# Patient Record
Sex: MALE | Race: WHITE | Employment: OTHER | URBAN - METROPOLITAN AREA
[De-identification: names, ages, dates, MRNs, and addresses within clinical notes are randomized per-mention and may not be internally consistent; named-entity substitution may affect disease eponyms.]

---

## 2017-02-13 ENCOUNTER — OFFICE VISIT (OUTPATIENT)
Dept: NEUROLOGY | Age: 72
End: 2017-02-13

## 2017-02-13 VITALS
HEART RATE: 87 BPM | WEIGHT: 189 LBS | HEIGHT: 70 IN | DIASTOLIC BLOOD PRESSURE: 70 MMHG | SYSTOLIC BLOOD PRESSURE: 114 MMHG | OXYGEN SATURATION: 98 % | RESPIRATION RATE: 12 BRPM | BODY MASS INDEX: 27.06 KG/M2

## 2017-02-13 DIAGNOSIS — G40.209 COMPLEX PARTIAL SEIZURE EVOLVING TO GENERALIZED SEIZURE (HCC): Primary | ICD-10-CM

## 2017-02-13 RX ORDER — MULTIVIT WITH MINERALS/HERBS
1 TABLET ORAL DAILY
COMMUNITY

## 2017-02-13 RX ORDER — MELATONIN
DAILY
COMMUNITY

## 2017-02-13 NOTE — PATIENT INSTRUCTIONS
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
## 2017-02-13 NOTE — MR AVS SNAPSHOT
Visit Information Date & Time Provider Department Dept. Phone Encounter #  
 2/13/2017  2:00 PM Callie Morales MD Neurology [facility] at [facility] [phone] [id number] Follow-up Instructions Return in about 6 months (around 8/13/2017). Upcoming Health Maintenance Date Due Hepatitis C Screening 1945 DTaP/Tdap/Td series (1 - Tdap) 7/1/1968 FOBT Q 1 YEAR AGE 50-75 7/1/1997 ZOSTER VACCINE AGE 60> 7/1/2007 GLAUCOMA SCREENING Q2Y 7/1/2012 Pneumococcal 65+ Low/Medium Risk (1 of 2 - PCV13) 7/1/2012 MEDICARE YEARLY EXAM 7/1/2012 INFLUENZA AGE 9 TO ADULT 8/1/2016 Allergies as of 2/13/2017  Review Complete On: 2/13/2017 By: Callie Morales MD  
 No Known Allergies Current Immunizations  Never Reviewed No immunizations on file. Not reviewed this visit You Were Diagnosed With   
  
 Codes Comments 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    -  Primary ICD-10-CM: N14.763 ICD-9-CM: 345.40 Vitals BP Pulse Resp Height(growth percentile) Weight(growth percentile) SpO2  
 114/70 87 12 5' 10\" (1.778 m) 189 lb (85.7 kg) 98% BMI Smoking Status 27.12 kg/m2 Never Smoker Vitals History BMI and BSA Data Body Mass Index Body Surface Area  
 27.12 kg/m 2 2.06 m 2 Preferred Pharmacy Pharmacy Name Phone [facility] [address] [phone] Your Updated Medication List  
  
   
This list is accurate as of: 2/13/17  2:43 PM.  Always use your most recent med list.  
  
  
  
  
 B COMPLEX 1 tablet Generic drug:  b complex vitamins Take 1 Tab by mouth daily. lacosamide 200 mg Tab tablet Commonly known as:  VIMPAT Take 200 mg by mouth two (2) times a day. PHENobarbital 30 mg tablet Commonly known as:  LUMINAL Take 30 mg by mouth two (2) times a day. primidone 250 mg tablet Commonly known as: MYSOLINE Take 500 mg by mouth two (2) times a day. VITAMIN D3 1,000 unit tablet Generic drug:  cholecalciferol Take  by mouth daily. Follow-up Instructions Return in about 6 months (around 8/13/2017). To-Do List   
 02/28/2017 Neurology:  EEG Patient Instructions A Healthy Lifestyle: Care Instructions Your Care Instructions A healthy lifestyle can help you feel good, stay at a healthy weight, and have plenty of energy for both work and play. A healthy lifestyle is something you can share with your whole family. A healthy lifestyle also can lower your risk for serious health problems, such as high blood pressure, heart disease, and diabetes. You can follow a few steps listed below to improve your health and the health of your family. Follow-up care is a key part of your treatment and safety. Be sure to make and go to all appointments, and call your doctor if you are having problems. Its also a good idea to know your test results and keep a list of the medicines you take. How can you care for yourself at home? · Do not eat too much sugar, fat, or fast foods. You can still have dessert and treats now and then. The goal is moderation. · Start small to improve your eating habits. Pay attention to portion sizes, drink less juice and soda pop, and eat more fruits and vegetables. ¨ Eat a healthy amount of food. A 3-ounce serving of meat, for example, is about the size of a deck of cards. Fill the rest of your plate with vegetables and whole grains. ¨ Limit the amount of soda and sports drinks you have every day. Drink more water when you are thirsty. ¨ Eat at least 5 servings of fruits and vegetables every day. It may seem like a lot, but it is not hard to reach this goal. A serving or helping is 1 piece of fruit, 1 cup of vegetables, or 2 cups of leafy, raw vegetables. Have an apple or some carrot sticks as an afternoon snack instead of a candy bar. Try to have fruits and/or vegetables at every meal. 
· Make exercise part of your daily routine. You may want to start with simple activities, such as walking, bicycling, or slow swimming. Try to be active 30 to 60 minutes every day. You do not need to do all 30 to 60 minutes all at once. For example, you can exercise 3 times a day for 10 or 20 minutes. Moderate exercise is safe for most people, but it is always a good idea to talk to your doctor before starting an exercise program. 
· Keep moving. Luci Bonds the lawn, work in the garden, or [employer]. Take the stairs instead of the elevator at work. · If you smoke, quit. People who smoke have an increased risk for heart attack, stroke, cancer, and other lung illnesses. Quitting is hard, but there are ways to boost your chance of quitting tobacco for good. ¨ Use nicotine gum, patches, or lozenges. ¨ Ask your doctor about stop-smoking programs and medicines. ¨ Keep trying. In addition to reducing your risk of diseases in the future, you will notice some benefits soon after you stop using tobacco. If you have shortness of breath or asthma symptoms, they will likely get better within a few weeks after you quit. · Limit how much alcohol you drink. Moderate amounts of alcohol (up to 2 drinks a day for men, 1 drink a day for women) are okay. But drinking too much can lead to liver problems, high blood pressure, and other health problems. Family health If you have a family, there are many things you can do together to improve your health. · Eat meals together as a family as often as possible. · Eat healthy foods. This includes fruits, vegetables, lean meats and dairy, and whole grains. · Include your family in your fitness plan.  Most people think of activities such as jogging or tennis as the way to fitness, but there are many ways you and your family can be more active. Anything that makes you breathe hard and gets your heart pumping is exercise. Here are some tips: 
¨ Walk to do errands or to take your child to school or the bus. ¨ Go for a family bike ride after dinner instead of watching TV. Where can you learn more? Go to http://elana-vu.info/. Enter G767 in the search box to learn more about \"A Healthy Lifestyle: Care Instructions. \" Current as of: July 26, 2016 Content Version: 11.1 © 0961-0038 [employer]. Care instructions adapted under license by [employer] (which disclaims liability or warranty for this information). If you have questions about a medical condition or this instruction, always ask your healthcare professional. [address] any warranty or liability for your use of this information. Introducing [facility]! [employer] introduces [employer] patient portal. Now you can access parts of your medical record, email your doctor's office, and request medication refills online. 1. In your internet browser, go to [email or web address] 2. Click on the First Time User? Click Here link in the Sign In box. You will see the New Member Sign Up page. 3. Enter your [employer] Access Code exactly as it appears below. You will not need to use this code after youve completed the sign-up process. If you do not sign up before the expiration date, you must request a new code. · [employer] Access Code: [email or web address] Expires: 5/14/2017  1:29 PM 
 
4. Enter the last four digits of your Social Security Number (xxxx) and Date of Birth (mm/dd/yyyy) as indicated and click Submit. You will be taken to the next sign-up page. 5. Create a [employer] ID. This will be your [employer] login ID and cannot be changed, so think of one that is secure and easy to remember. 6. Create a [employer] password. You can change your password at any time. 7. Enter your Password Reset Question and Answer. This can be used at a later time if you forget your password. 8. Enter your e-mail address. You will receive e-mail notification when new information is available in [address] 9. Click Sign Up. You can now view and download portions of your medical record. 10. Click the Download Summary menu link to download a portable copy of your medical information. If you have questions, please visit the Frequently Asked Questions section of the [employer] website. Remember, [employer] is NOT to be used for urgent needs. For medical emergencies, dial 911. Now available from your iPhone and Android! Please provide this summary of care documentation to your next provider. Your primary care clinician is listed as Judithann Cockayne. If you have any questions after today's visit, please call [phone].

## 2017-02-24 ENCOUNTER — HOSPITAL ENCOUNTER (OUTPATIENT)
Dept: NEUROLOGY | Age: 72
Discharge: HOME | End: 2017-02-24
Attending: PSYCHIATRY & NEUROLOGY

## 2017-02-24 DIAGNOSIS — G40.209 COMPLEX PARTIAL SEIZURE EVOLVING TO GENERALIZED SEIZURE (HCC): ICD-10-CM

## 2017-02-24 NOTE — PROCEDURES
Patient Name: Cande Olivas  : 1945  Age: 73 y.o. Ordering physician: Deni Barcenas  Date of EE2017  EEG procedure number: [id number]  Diagnosis: CPS  Interpreting physician: Stephen Marie MD      ELECTROENCEPHALOGRAM REPORT     PROCEDURE: EEG. CLINICAL INDICATION: The patient is a 73 y.o. male with a history of   possible seizures. EEG to rule out seizures, rule out stroke, rule out   cortical abnormality. EEG CLASSIFICATION: Essentially normal    DESCRIPTION OF THE RECORD:   The background of this recording contains a posteriorly-located occipital alpha rhythm of 9 Hz that attenuates with eye opening. Throughout the recording, there were no clear areas of focal slowing nor spike or spike-and-wave discharges seen. Hyperventilation was not performed. Photic stimulation produced a minimal driving response in the posterior head regions. During the recording the patient did not achieve stage II sleep    INTERPRETATION: This is a normal electroencephalogram showing no clear focal abnormalities or epileptiform activity. A normal EEG doesn't not rule out seizures. Clinical correlation recommended.       Akilah Lua MD  2017  3:17 PM

## 2017-03-06 ENCOUNTER — TELEPHONE (OUTPATIENT)
Dept: NEUROLOGY | Age: 72
End: 2017-03-06

## 2017-03-06 NOTE — TELEPHONE ENCOUNTER
Patient like to verify if  recvd the medical records from [facility] office and if he had a chance to review the EEG

## 2017-03-06 NOTE — TELEPHONE ENCOUNTER
EEG is in the chart for your review. Notes should have come in the mail.  Advised will look in the mail as these have not come across the desk at the moment    Called: [phone] Dr Ele Jackson office and asked for them to refax

## 2017-03-09 NOTE — TELEPHONE ENCOUNTER
I called the patient, told him EEG was normal, he is doing well, he wants to be seen in June for follow-up    Alexis Brown, can you call him and give him an appointment in June, he wants to be seen in [place]

## 2017-06-29 ENCOUNTER — OFFICE VISIT (OUTPATIENT)
Dept: NEUROLOGY | Age: 72
End: 2017-06-29

## 2017-06-29 VITALS
HEART RATE: 99 BPM | OXYGEN SATURATION: 98 % | RESPIRATION RATE: 12 BRPM | BODY MASS INDEX: 28.06 KG/M2 | DIASTOLIC BLOOD PRESSURE: 78 MMHG | WEIGHT: 196 LBS | HEIGHT: 70 IN | SYSTOLIC BLOOD PRESSURE: 146 MMHG

## 2017-06-29 DIAGNOSIS — G40.209 COMPLEX PARTIAL SEIZURE EVOLVING TO GENERALIZED SEIZURE (HCC): Primary | ICD-10-CM

## 2017-06-29 RX ORDER — PRIMIDONE 250 MG/1
500 TABLET ORAL 2 TIMES DAILY
Qty: 120 TAB | Refills: 11 | Status: SHIPPED | OUTPATIENT
Start: 2017-06-29 | End: 2017-12-14 | Stop reason: SDUPTHER

## 2017-06-29 RX ORDER — LACOSAMIDE 200 MG/1
200 TABLET ORAL 2 TIMES DAILY
Qty: 60 TAB | Refills: 11 | Status: SHIPPED | OUTPATIENT
Start: 2017-06-29 | End: 2017-12-14 | Stop reason: SDUPTHER

## 2017-06-29 NOTE — MR AVS SNAPSHOT
Visit Information Date & Time Provider Department Dept. Phone Encounter #  
 6/29/2017 12:00 PM Sherita Roman MD Neurology Clinic at [facility] [phone] [id number] Follow-up Instructions Return in about 6 months (around 12/29/2017). Your Appointments 12/14/2017 12:00 PM  
Follow Up with Sherita Roman MD  
Neurology Clinic at [facility] Appt Note: f/u seizure, DMV form jrb 6/29/17  
 [address] [facility], 
[address], [address] [address]  
[address] [facility], [address] [facility], [address] [address] Upcoming Health Maintenance Date Due Hepatitis C Screening 1945 DTaP/Tdap/Td series (1 - Tdap) 7/1/1968 FOBT Q 1 YEAR AGE 50-75 7/1/1997 ZOSTER VACCINE AGE 60> 7/1/2007 GLAUCOMA SCREENING Q2Y 7/1/2012 Pneumococcal 65+ Low/Medium Risk (1 of 2 - PCV13) 7/1/2012 MEDICARE YEARLY EXAM 7/1/2012 INFLUENZA AGE 9 TO ADULT 8/1/2017 Allergies as of 6/29/2017  Review Complete On: 6/29/2017 By: Wanda Patrick No Known Allergies Current Immunizations  Never Reviewed No immunizations on file. Not reviewed this visit You Were Diagnosed With   
  
 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  
 146/78 99 12 5' 10\" (1.778 m) 196 lb (88.9 kg) 98% BMI Smoking Status 28.12 kg/m2 Never Smoker Vitals History BMI and BSA Data Body Mass Index Body Surface Area  
 28.12 kg/m 2 2.1 m 2 Preferred Pharmacy Pharmacy Name Phone Quir[facility] [address] [phone] Your Updated Medication List  
  
   
This list is accurate as of: 6/29/17  9:13 PM.  Always use your most recent med list.  
  
  
  
  
 B COMPLEX 1 tablet Generic drug:  b complex vitamins Take 1 Tab by mouth daily. lacosamide 200 mg Tab tablet Commonly known as:  VIMPAT Take 1 Tab by mouth two (2) times a day. Max Daily Amount: 400 mg. PHENobarbital 30 mg tablet Commonly known as:  LUMINAL Take 30 mg by mouth two (2) times a day. primidone 250 mg tablet Commonly known as: MYSOLINE Take 2 Tabs by mouth two (2) times a day. VITAMIN D3 1,000 unit tablet Generic drug:  cholecalciferol Take  by mouth daily. Prescriptions Printed Refills  
 lacosamide (VIMPAT) 200 mg tab tablet 11 Sig: Take 1 Tab by mouth two (2) times a day. Max Daily Amount: 400 mg. Class: Print Route: Oral  
  
Prescriptions Sent to Pharmacy Refills  
 primidone (MYSOLINE) 250 mg tablet 11 Sig: Take 2 Tabs by mouth two (2) times a day. Class: Normal  
 Pharmacy: [address], [address] [place] [address]. Ph #: [phone] Route: Oral  
  
We Performed the Following LACOSAMIDE [90714 CPT(R)] PHENOBARBITAL [id number] CPT(R)] PRIMIDONE (MYSOLINE) [id number] CPT(R)] Follow-up Instructions Return in about 6 months (around 12/29/2017). Patient Instructions A Healthy Lifestyle: Care Instructions Your Care Instructions A healthy lifestyle can help you feel good, stay at a healthy weight, and have plenty of energy for both work and play. A healthy lifestyle is something you can share with your whole family. A healthy lifestyle also can lower your risk for serious health problems, such as high blood pressure, heart disease, and diabetes. You can follow a few steps listed below to improve your health and the health of your family. Follow-up care is a key part of your treatment and safety.  Be sure to make and go to all appointments, and call your doctor if you are having problems. Its also a good idea to know your test results and keep a list of the medicines you take. How can you care for yourself at home? · Do not eat too much sugar, fat, or fast foods. You can still have dessert and treats now and then. The goal is moderation. · Start small to improve your eating habits. Pay attention to portion sizes, drink less juice and soda pop, and eat more fruits and vegetables. ¨ Eat a healthy amount of food. A 3-ounce serving of meat, for example, is about the size of a deck of cards. Fill the rest of your plate with vegetables and whole grains. ¨ Limit the amount of soda and sports drinks you have every day. Drink more water when you are thirsty. ¨ Eat at least 5 servings of fruits and vegetables every day. It may seem like a lot, but it is not hard to reach this goal. A serving or helping is 1 piece of fruit, 1 cup of vegetables, or 2 cups of leafy, raw vegetables. Have an apple or some carrot sticks as an afternoon snack instead of a candy bar. Try to have fruits and/or vegetables at every meal. 
· Make exercise part of your daily routine. You may want to start with simple activities, such as walking, bicycling, or slow swimming. Try to be active 30 to 60 minutes every day. You do not need to do all 30 to 60 minutes all at once. For example, you can exercise 3 times a day for 10 or 20 minutes. Moderate exercise is safe for most people, but it is always a good idea to talk to your doctor before starting an exercise program. 
· Keep moving. Micheline Gravely the lawn, work in the garden, or [employer]. Take the stairs instead of the elevator at work. · If you smoke, quit. People who smoke have an increased risk for heart attack, stroke, cancer, and other lung illnesses. Quitting is hard, but there are ways to boost your chance of quitting tobacco for good. ¨ Use nicotine gum, patches, or lozenges. ¨ Ask your doctor about stop-smoking programs and medicines. ¨ Keep trying. In addition to reducing your risk of diseases in the future, you will notice some benefits soon after you stop using tobacco. If you have shortness of breath or asthma symptoms, they will likely get better within a few weeks after you quit. · Limit how much alcohol you drink. Moderate amounts of alcohol (up to 2 drinks a day for men, 1 drink a day for women) are okay. But drinking too much can lead to liver problems, high blood pressure, and other health problems. Family health If you have a family, there are many things you can do together to improve your health. · Eat meals together as a family as often as possible. · Eat healthy foods. This includes fruits, vegetables, lean meats and dairy, and whole grains. · Include your family in your fitness plan. Most people think of activities such as jogging or tennis as the way to fitness, but there are many ways you and your family can be more active. Anything that makes you breathe hard and gets your heart pumping is exercise. Here are some tips: 
¨ Walk to do errands or to take your child to school or the bus. ¨ Go for a family bike ride after dinner instead of watching TV. Where can you learn more? Go to http://elanaPowtoonvu.info/. Enter Y524 in the search box to learn more about \"A Healthy Lifestyle: Care Instructions. \" Current as of: July 26, 2016 Content Version: 11.3 © 9709-6200 [employer]. Care instructions adapted under license by [employer] (which disclaims liability or warranty for this information). If you have questions about a medical condition or this instruction, always ask your healthcare professional. [place] [address] any warranty or liability for your use of this information. Introducing [facility]! [facility] introduces [employer]ada patient portal. Now you can access parts of your medical record, email your doctor's office, and request medication refills online. 1. In your internet browser, go to [email or web address] 2. Click on the First Time User? Click Here link in the Sign In box. You will see the New Member Sign Up page. 3. Enter your Telepath Access Code exactly as it appears below. You will not need to use this code after youve completed the sign-up process. If you do not sign up before the expiration date, you must request a new code. · Telepath Access Code: [email or web address] Expires: 9/27/2017 12:06 PM 
 
4. Enter the last four digits of your Social Security Number (xxxx) and Date of Birth (mm/dd/yyyy) as indicated and click Submit. You will be taken to the next sign-up page. 5. Create a 3Guppiest ID. This will be your Telepath login ID and cannot be changed, so think of one that is secure and easy to remember. 6. Create a Telepath password. You can change your password at any time. 7. Enter your Password Reset Question and Answer. This can be used at a later time if you forget your password. 8. Enter your e-mail address. You will receive e-mail notification when new information is available in [address] 9. Click Sign Up. You can now view and download portions of your medical record. 10. Click the Download Summary menu link to download a portable copy of your medical information. If you have questions, please visit the Frequently Asked Questions section of the Telepath website. Remember, Telepath is NOT to be used for urgent needs. For medical emergencies, dial 911. Now available from your iPhone and Android! Please provide this summary of care documentation to your next provider. Your primary care clinician is listed as Autumn Nageotte. If you have any questions after today's visit, please call [phone].
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
## 2017-06-29 NOTE — PATIENT INSTRUCTIONS
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
## 2017-07-21 LAB
LACOSAMIDE SERPL-MCNC: 8.1 UG/ML (ref 5–10)
PHENOBARB SERPL-MCNC: 20 UG/ML (ref 15–40)
PHENOBARB SERPL-MCNC: 20 UG/ML (ref 15–40)
PRIMIDONE SERPL-MCNC: 17.6 UG/ML (ref 5–12)

## 2017-10-26 ENCOUNTER — TELEPHONE (OUTPATIENT)
Dept: NEUROLOGY | Age: 72
End: 2017-10-26

## 2017-10-26 RX ORDER — PHENOBARBITAL 30 MG/1
30 TABLET ORAL 2 TIMES DAILY
Qty: 60 TAB | Refills: 1 | Status: SHIPPED | OUTPATIENT
Start: 2017-10-26 | End: 2017-12-14 | Stop reason: SDUPTHER

## 2017-10-26 NOTE — TELEPHONE ENCOUNTER
Patient's phone can not receive calls. Future Appointments  Date Time Provider Fabi Belloi   12/14/2017 12:00 PM Elise Soler MD [address]                         Last Appointment My Department:  6/29/2017    Please advise of refill below. Requested Prescriptions     Pending Prescriptions Disp Refills    PHENobarbital (LUMINAL) 30 mg tablet 60 Tab 1     Sig: Take 1 Tab by mouth two (2) times a day. Max Daily Amount: 60 mg.

## 2017-10-26 NOTE — TELEPHONE ENCOUNTER
Patient needs refill on Phenobarbital ... Patient indicates that Dr Costa Greenberg is the one who prescribed but needs Dr Laura Tolbert to fill now . ..  Please call [phone]

## 2017-10-27 NOTE — TELEPHONE ENCOUNTER
This medication was called in to the pharmacy and left ton voicemail.  Tried to call the patient to notify this, but phone states that it is not set up to receive calls

## 2017-12-14 ENCOUNTER — OFFICE VISIT (OUTPATIENT)
Dept: NEUROLOGY | Age: 72
End: 2017-12-14

## 2017-12-14 VITALS
RESPIRATION RATE: 16 BRPM | OXYGEN SATURATION: 97 % | SYSTOLIC BLOOD PRESSURE: 132 MMHG | BODY MASS INDEX: 26.92 KG/M2 | HEIGHT: 70 IN | DIASTOLIC BLOOD PRESSURE: 78 MMHG | WEIGHT: 188 LBS | HEART RATE: 99 BPM

## 2017-12-14 DIAGNOSIS — G40.209 COMPLEX PARTIAL SEIZURE EVOLVING TO GENERALIZED SEIZURE (HCC): Primary | ICD-10-CM

## 2017-12-14 RX ORDER — LACOSAMIDE 200 MG/1
200 TABLET ORAL 2 TIMES DAILY
Qty: 60 TAB | Refills: 11 | Status: SHIPPED | OUTPATIENT
Start: 2017-12-14 | End: 2018-01-16 | Stop reason: SDUPTHER

## 2017-12-14 RX ORDER — PHENOBARBITAL 30 MG/1
30 TABLET ORAL 2 TIMES DAILY
Qty: 180 TAB | Refills: 5 | Status: SHIPPED | OUTPATIENT
Start: 2017-12-14 | End: 2018-07-20 | Stop reason: SDUPTHER

## 2017-12-14 RX ORDER — PRIMIDONE 250 MG/1
500 TABLET ORAL 2 TIMES DAILY
Qty: 360 TAB | Refills: 5 | Status: SHIPPED | OUTPATIENT
Start: 2017-12-14 | End: 2018-12-18 | Stop reason: SDUPTHER

## 2017-12-14 NOTE — PATIENT INSTRUCTIONS
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
## 2017-12-14 NOTE — MR AVS SNAPSHOT
Visit Information Date & Time Provider Department Dept. Phone Encounter #  
 12/14/2017 12:00 PM Taylor Leong MD [facility] [phone] [id number] Follow-up Instructions Return in about 1 year (around 12/14/2018). Upcoming Health Maintenance Date Due Hepatitis C Screening 1945 DTaP/Tdap/Td series (1 - Tdap) 7/1/1968 FOBT Q 1 YEAR AGE 50-75 7/1/1997 ZOSTER VACCINE AGE 60> 5/1/2007 GLAUCOMA SCREENING Q2Y 7/1/2012 Pneumococcal 65+ Low/Medium Risk (1 of 2 - PCV13) 7/1/2012 MEDICARE YEARLY EXAM 7/1/2012 Influenza Age 5 to Adult 8/1/2017 Allergies as of 12/14/2017  Review Complete On: 12/14/2017 By: Taylor Leong MD  
 No Known Allergies Current Immunizations  Never Reviewed No immunizations on file. Not reviewed this visit You Were Diagnosed With   
  
 Codes Comments 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    -  Primary ICD-10-CM: I67.950 ICD-9-CM: 345.40 Vitals BP Pulse Resp Height(growth percentile) Weight(growth percentile) SpO2  
 132/78 99 16 5' 10\" (1.778 m) 188 lb (85.3 kg) 97% BMI Smoking Status 26.98 kg/m2 Never Smoker Vitals History BMI and BSA Data Body Mass Index Body Surface Area  
 26.98 kg/m 2 2.05 m 2 Preferred Pharmacy Pharmacy Name Phone [facility] [address] [phone] Your Updated Medication List  
  
   
This list is accurate as of: 12/14/17 12:26 PM.  Always use your most recent med list.  
  
  
  
  
 B COMPLEX 1 tablet Generic drug:  b complex vitamins Take 1 Tab by mouth daily. lacosamide 200 mg Tab tablet Commonly known as:  VIMPAT Take 1 Tab by mouth two (2) times a day. Max Daily Amount: 400 mg. PHENobarbital 30 mg tablet Commonly known as:  LUMINAL  
 Take 1 Tab by mouth two (2) times a day. Max Daily Amount: 60 mg.  
  
 primidone 250 mg tablet Commonly known as: MYSOLINE Take 2 Tabs by mouth two (2) times a day. VITAMIN D3 1,000 unit tablet Generic drug:  cholecalciferol Take  by mouth daily. Prescriptions Printed Refills PHENobarbital (LUMINAL) 30 mg tablet 5 Sig: Take 1 Tab by mouth two (2) times a day. Max Daily Amount: 60 mg.  
 Class: Print Route: Oral  
 lacosamide (VIMPAT) 200 mg tab tablet 11 Sig: Take 1 Tab by mouth two (2) times a day. Max Daily Amount: 400 mg. Class: Print Route: Oral  
  
Prescriptions Sent to Pharmacy Refills  
 primidone (MYSOLINE) 250 mg tablet 5 Sig: Take 2 Tabs by mouth two (2) times a day. Class: Normal  
 Pharmacy: [facility] [address] [place] [address].  #: [phone] Route: Oral  
  
We Performed the Following LACOSAMIDE [37733 CPT(R)] PHENOBARBITAL [id number] CPT(R)] PRIMIDONE (MYSOLINE) [id number] CPT(R)] Follow-up Instructions Return in about 1 year (around 12/14/2018). Patient Instructions A Healthy Lifestyle: Care Instructions Your Care Instructions A healthy lifestyle can help you feel good, stay at a healthy weight, and have plenty of energy for both work and play. A healthy lifestyle is something you can share with your whole family. A healthy lifestyle also can lower your risk for serious health problems, such as high blood pressure, heart disease, and diabetes. You can follow a few steps listed below to improve your health and the health of your family. Follow-up care is a key part of your treatment and safety. Be sure to make and go to all appointments, and call your doctor if you are having problems. It's also a good idea to know your test results and keep a list of the medicines you take. How can you care for yourself at home? · Do not eat too much sugar, fat, or fast foods. You can still have dessert and treats now and then. The goal is moderation. · Start small to improve your eating habits. Pay attention to portion sizes, drink less juice and soda pop, and eat more fruits and vegetables. ¨ Eat a healthy amount of food. A 3-ounce serving of meat, for example, is about the size of a deck of cards. Fill the rest of your plate with vegetables and whole grains. ¨ Limit the amount of soda and sports drinks you have every day. Drink more water when you are thirsty. ¨ Eat at least 5 servings of fruits and vegetables every day. It may seem like a lot, but it is not hard to reach this goal. A serving or helping is 1 piece of fruit, 1 cup of vegetables, or 2 cups of leafy, raw vegetables. Have an apple or some carrot sticks as an afternoon snack instead of a candy bar. Try to have fruits and/or vegetables at every meal. 
· Make exercise part of your daily routine. You may want to start with simple activities, such as walking, bicycling, or slow swimming. Try to be active 30 to 60 minutes every day. You do not need to do all 30 to 60 minutes all at once. For example, you can exercise 3 times a day for 10 or 20 minutes. Moderate exercise is safe for most people, but it is always a good idea to talk to your doctor before starting an exercise program. 
· Keep moving. Emily Haus the lawn, work in the garden, or [employer]. Take the stairs instead of the elevator at work. · If you smoke, quit. People who smoke have an increased risk for heart attack, stroke, cancer, and other lung illnesses. Quitting is hard, but there are ways to boost your chance of quitting tobacco for good. ¨ Use nicotine gum, patches, or lozenges. ¨ Ask your doctor about stop-smoking programs and medicines. ¨ Keep trying.  
In addition to reducing your risk of diseases in the future, you will notice some benefits soon after you stop using tobacco. If you have shortness of breath or asthma symptoms, they will likely get better within a few weeks after you quit. · Limit how much alcohol you drink. Moderate amounts of alcohol (up to 2 drinks a day for men, 1 drink a day for women) are okay. But drinking too much can lead to liver problems, high blood pressure, and other health problems. Family health If you have a family, there are many things you can do together to improve your health. · Eat meals together as a family as often as possible. · Eat healthy foods. This includes fruits, vegetables, lean meats and dairy, and whole grains. · Include your family in your fitness plan. Most people think of activities such as jogging or tennis as the way to fitness, but there are many ways you and your family can be more active. Anything that makes you breathe hard and gets your heart pumping is exercise. Here are some tips: 
¨ Walk to do errands or to take your child to school or the bus. ¨ Go for a family bike ride after dinner instead of watching TV. Where can you learn more? Go to http://elana-vu.info/. Enter W147 in the search box to learn more about \"A Healthy Lifestyle: Care Instructions. \" Current as of: May 12, 2017 Content Version: 11.4 © 9114-9253 Healthwise, Incorporated. Care instructions adapted under license by [employer] (which disclaims liability or warranty for this information). If you have questions about a medical condition or this instruction, always ask your healthcare professional. [place] [address] any warranty or liability for your use of this information. Introducing [facility]! Lion Ulloa introduces [employer] patient portal. Now you can access parts of your medical record, email your doctor's office, and request medication refills online. 1. In your internet browser, go to [email or web address] 2. Click on the First Time User? Click Here link in the Sign In box. You will see the New Member Sign Up page. 3. Enter your Connect[employer] Access Code exactly as it appears below. You will not need to use this code after youve completed the sign-up process. If you do not sign up before the expiration date, you must request a new code. · ConnectFu Access Code: [email or web address] Expires: 3/14/2018 11:53 AM 
 
4. Enter the last four digits of your Social Security Number (xxxx) and Date of Birth (mm/dd/yyyy) as indicated and click Submit. You will be taken to the next sign-up page. 5. Create a ConnectFu ID. This will be your ConnectFu login ID and cannot be changed, so think of one that is secure and easy to remember. 6. Create a ConnectFu password. You can change your password at any time. 7. Enter your Password Reset Question and Answer. This can be used at a later time if you forget your password. 8. Enter your e-mail address. You will receive e-mail notification when new information is available in [address] 9. Click Sign Up. You can now view and download portions of your medical record. 10. Click the Download Summary menu link to download a portable copy of your medical information. If you have questions, please visit the Frequently Asked Questions section of the ConnectFu website. Remember, ConnectFu is NOT to be used for urgent needs. For medical emergencies, dial 911. Now available from your iPhone and Android! Please provide this summary of care documentation to your next provider. Your primary care clinician is listed as Sania Dunn. If you have any questions after today's visit, please call [phone].
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
## 2017-12-18 LAB
LACOSAMIDE SERPL-MCNC: 7.9 UG/ML (ref 5–10)
PHENOBARB SERPL-MCNC: 19 UG/ML (ref 15–40)
PHENOBARB SERPL-MCNC: 20 UG/ML (ref 15–40)
PRIMIDONE SERPL-MCNC: 17.1 UG/ML (ref 5–12)

## 2017-12-19 ENCOUNTER — DOCUMENTATION ONLY (OUTPATIENT)
Dept: NEUROLOGY | Age: 72
End: 2017-12-19

## 2018-01-16 DIAGNOSIS — G40.209 COMPLEX PARTIAL SEIZURE EVOLVING TO GENERALIZED SEIZURE (HCC): ICD-10-CM

## 2018-01-16 RX ORDER — LACOSAMIDE 200 MG/1
TABLET, FILM COATED ORAL
Qty: 60 TAB | Refills: 4 | Status: SHIPPED | OUTPATIENT
Start: 2018-01-16 | End: 2018-05-29 | Stop reason: SDUPTHER

## 2018-01-17 ENCOUNTER — DOCUMENTATION ONLY (OUTPATIENT)
Dept: NEUROLOGY | Age: 73
End: 2018-01-17

## 2018-01-28 DIAGNOSIS — G40.209 COMPLEX PARTIAL SEIZURE EVOLVING TO GENERALIZED SEIZURE (HCC): ICD-10-CM

## 2018-01-29 RX ORDER — PHENOBARBITAL 30 MG/1
TABLET ORAL
Qty: 60 TAB | Refills: 0 | OUTPATIENT
Start: 2018-01-29

## 2018-01-29 NOTE — TELEPHONE ENCOUNTER
Refused script as confirmed with pharmacy that patient still has refills from 12/14/17 refill sent in

## 2018-05-29 DIAGNOSIS — G40.209 COMPLEX PARTIAL SEIZURE EVOLVING TO GENERALIZED SEIZURE (HCC): ICD-10-CM

## 2018-05-29 RX ORDER — LACOSAMIDE 200 MG/1
TABLET ORAL
Qty: 60 TAB | Refills: 5 | Status: SHIPPED | OUTPATIENT
Start: 2018-05-29 | End: 2018-12-17 | Stop reason: SDUPTHER

## 2018-05-29 NOTE — TELEPHONE ENCOUNTER
Future Appointments  Date Time Provider Fabi Belloi   12/18/2018 1:00 PM Jn Mora MD [address]                         Last Appointment My Department:  12/14/2017    Please advise of refill below.    Requested Prescriptions     Pending Prescriptions Disp Refills    lacosamide (VIMPAT) 200 mg tab tablet 60 Tab 5     Sig: TAKE ONE TABLET BY MOUTH TWICE A DAY

## 2018-07-20 DIAGNOSIS — G40.209 COMPLEX PARTIAL SEIZURE EVOLVING TO GENERALIZED SEIZURE (HCC): ICD-10-CM

## 2018-07-20 RX ORDER — PHENOBARBITAL 30 MG/1
TABLET ORAL
Qty: 180 TAB | Refills: 4 | Status: SHIPPED | OUTPATIENT
Start: 2018-07-20 | End: 2018-12-18 | Stop reason: SDUPTHER

## 2018-07-20 NOTE — TELEPHONE ENCOUNTER
Future Appointments  Date Time Provider Fabi Alexander   12/18/2018 1:00 PM Nathaly Roman MD [address]                         Last Appointment My Department:  Visit date not found    Would you like to refill below?       Requested Prescriptions     Pending Prescriptions Disp Refills    PHENobarbital (LUMINAL) 30 mg tablet [Pharmacy Med Name: PHENOBARBITAL 30 MG TABLET] 180 Tab 4     Sig: TAKE ONE TABLET BY MOUTH TWICE A DAY     Patient came by the office with the empty pill bottle requesting a refill for this mediction

## 2018-07-23 ENCOUNTER — DOCUMENTATION ONLY (OUTPATIENT)
Dept: NEUROLOGY | Age: 73
End: 2018-07-23

## 2018-12-17 DIAGNOSIS — G40.209 COMPLEX PARTIAL SEIZURE EVOLVING TO GENERALIZED SEIZURE (HCC): ICD-10-CM

## 2018-12-17 RX ORDER — LACOSAMIDE 200 MG/1
TABLET ORAL
Qty: 60 TAB | Refills: 4 | Status: SHIPPED | OUTPATIENT
Start: 2018-12-17 | End: 2018-12-18 | Stop reason: SDUPTHER

## 2018-12-18 ENCOUNTER — OFFICE VISIT (OUTPATIENT)
Dept: NEUROLOGY | Age: 73
End: 2018-12-18

## 2018-12-18 VITALS
HEART RATE: 86 BPM | HEIGHT: 70 IN | WEIGHT: 198 LBS | OXYGEN SATURATION: 98 % | BODY MASS INDEX: 28.35 KG/M2 | DIASTOLIC BLOOD PRESSURE: 80 MMHG | RESPIRATION RATE: 12 BRPM | SYSTOLIC BLOOD PRESSURE: 120 MMHG

## 2018-12-18 DIAGNOSIS — G40.209 COMPLEX PARTIAL SEIZURE EVOLVING TO GENERALIZED SEIZURE (HCC): Primary | ICD-10-CM

## 2018-12-18 RX ORDER — PHENOBARBITAL 30 MG/1
TABLET ORAL
Qty: 180 TAB | Refills: 4 | Status: SHIPPED | OUTPATIENT
Start: 2018-12-18 | End: 2019-07-06 | Stop reason: SDUPTHER

## 2018-12-18 RX ORDER — LACOSAMIDE 200 MG/1
TABLET ORAL
Qty: 60 TAB | Refills: 11 | Status: SHIPPED | OUTPATIENT
Start: 2018-12-18 | End: 2019-05-23 | Stop reason: SDUPTHER

## 2018-12-18 RX ORDER — PRIMIDONE 250 MG/1
500 TABLET ORAL 2 TIMES DAILY
Qty: 360 TAB | Refills: 5 | Status: SHIPPED | OUTPATIENT
Start: 2018-12-18 | End: 2019-12-17 | Stop reason: SDUPTHER

## 2018-12-18 NOTE — LETTER
12/18/2018 1:55 PM 
 
Patient:  Yolanda uDong YOB: 1945 Date of Visit: 12/18/2018 Dear No Recipients: Thank you for referring Mr. Yolanda Duong to me for evaluation/treatment. Below are the relevant portions of my assessment and plan of care. If you have questions, please do not hesitate to call me. I look forward to following Mr. Henriquez along with you. Sincerely, Tony Marshall MD
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
## 2018-12-18 NOTE — PATIENT INSTRUCTIONS
Office Policies  o Phone calls/patient messages:  Please allow up to 24 hours for someone in the office to contact you about your call or message. Be mindful your provider may be out of the office or your message may require further review. We encourage you to use [employer] for your messages as this is a faster, more efficient way to communicate with our office  o Medication Refills:  Prescription medications require up to 48 business hours to process. We encourage you to use [employer] for your refills. For controlled medications: Please allow up to 72 business hours to process. Certain medications may require you to  a written prescription at our office. NO narcotic/controlled medications will be prescribed after 4pm Monday through Friday or on weekends  o Form/Paperwork Compl We ask that you allow 7-14 business days. You may also download your forms to [employer] to have your doctor print off.  o Test Results: In order for a patient to obtain test results, an appointment must be made with the physician to review the results. Test results cannot be discussed over the phone. A Healthy Lifestyle: Care Instructions  Your Care Instructions    A healthy lifestyle can help you feel good, stay at a healthy weight, and have plenty of energy for both work and play. A healthy lifestyle is something you can share with your whole family. A healthy lifestyle also can lower your risk for serious health problems, such as high blood pressure, heart disease, and diabetes. You can follow a few steps listed below to improve your health and the health of your family. Follow-up care is a key part of your treatment and safety. Be sure to make and go to all appointments, and call your doctor if you are having problems. It's also a good idea to know your test results and keep a list of the medicines you take. How can you care for yourself at home? · Do not eat too much sugar, fat, or fast foods.  You can still have dessert and treats now and then. The goal is moderation. · Start small to improve your eating habits. Pay attention to portion sizes, drink less juice and soda pop, and eat more fruits and vegetables. ? Eat a healthy amount of food. A 3-ounce serving of meat, for example, is about the size of a deck of cards. Fill the rest of your plate with vegetables and whole grains. ? Limit the amount of soda and sports drinks you have every day. Drink more water when you are thirsty. ? Eat at least 5 servings of fruits and vegetables every day. It may seem like a lot, but it is not hard to reach this goal. A serving or helping is 1 piece of fruit, 1 cup of vegetables, or 2 cups of leafy, raw vegetables. Have an apple or some carrot sticks as an afternoon snack instead of a candy bar. Try to have fruits and/or vegetables at every meal.  · Make exercise part of your daily routine. You may want to start with simple activities, such as walking, bicycling, or slow swimming. Try to be active 30 to 60 minutes every day. You do not need to do all 30 to 60 minutes all at once. For example, you can exercise 3 times a day for 10 or 20 minutes. Moderate exercise is safe for most people, but it is always a good idea to talk to your doctor before starting an exercise program.  · Keep moving. Tarrant Chard the lawn, work in the garden, or [employer]. Take the stairs instead of the elevator at work. · If you smoke, quit. People who smoke have an increased risk for heart attack, stroke, cancer, and other lung illnesses. Quitting is hard, but there are ways to boost your chance of quitting tobacco for good. ? Use nicotine gum, patches, or lozenges. ? Ask your doctor about stop-smoking programs and medicines. ? Keep trying.   In addition to reducing your risk of diseases in the future, you will notice some benefits soon after you stop using tobacco. If you have shortness of breath or asthma symptoms, they will likely get better within a few weeks after you quit. · Limit how much alcohol you drink. Moderate amounts of alcohol (up to 2 drinks a day for men, 1 drink a day for women) are okay. But drinking too much can lead to liver problems, high blood pressure, and other health problems. Family health  If you have a family, there are many things you can do together to improve your health. · Eat meals together as a family as often as possible. · Eat healthy foods. This includes fruits, vegetables, lean meats and dairy, and whole grains. · Include your family in your fitness plan. Most people think of activities such as jogging or tennis as the way to fitness, but there are many ways you and your family can be more active. Anything that makes you breathe hard and gets your heart pumping is exercise. Here are some tips:  ? Walk to do errands or to take your child to school or the bus.  ? Go for a family bike ride after dinner instead of watching TV. Where can you learn more? Go to http://elana-vu.info/. Enter I747 in the search box to learn more about \"A Healthy Lifestyle: Care Instructions. \"  Current as of: December 7, 2017  Content Version: 11.8  © 9435-4029 [employer]. Care instructions adapted under license by [employer] (which disclaims liability or warranty for this information). If you have questions about a medical condition or this instruction, always ask your healthcare professional. [place] [address] any warranty or liability for your use of this information.

## 2018-12-20 ENCOUNTER — DOCUMENTATION ONLY (OUTPATIENT)
Dept: NEUROLOGY | Age: 73
End: 2018-12-20

## 2019-05-23 DIAGNOSIS — G40.209 COMPLEX PARTIAL SEIZURE EVOLVING TO GENERALIZED SEIZURE (HCC): ICD-10-CM

## 2019-05-23 RX ORDER — LACOSAMIDE 200 MG/1
TABLET ORAL
Qty: 60 TAB | Refills: 11 | Status: SHIPPED | OUTPATIENT
Start: 2019-05-23 | End: 2019-12-17 | Stop reason: SDUPTHER

## 2019-05-23 NOTE — TELEPHONE ENCOUNTER
Future Appointments   Date Time Provider Fabi Belloi   12/17/2019 10:20 AM Lynnette Dominguez MD [address] [place] [address]                         Last Appointment My Department:  12/18/18    Please advise of refill below.   Requested Prescriptions     Pending Prescriptions Disp Refills    lacosamide (VIMPAT) 200 mg tab tablet 60 Tab 11     Sig: TAKE ONE TABLET BY MOUTH TWICE A DAY

## 2019-06-03 ENCOUNTER — TELEPHONE (OUTPATIENT)
Dept: NEUROLOGY | Age: 74
End: 2019-06-03

## 2019-06-03 NOTE — TELEPHONE ENCOUNTER
----- Message from Stacey Olivia sent at 6/3/2019 12:17 PM EDT -----  Regarding: /Telephone  Pt is requesting a call back in reference to wanting to know if his medication is ready for  \"Zimpat\". Best contact number is [phone].

## 2019-06-04 ENCOUNTER — TELEPHONE (OUTPATIENT)
Dept: NEUROLOGY | Age: 74
End: 2019-06-04

## 2019-06-04 NOTE — TELEPHONE ENCOUNTER
----- Message from Maicol Johns sent at 6/4/2019 10:33 AM EDT -----  Regarding: Dr. Mindi Arrington  Pt requested a call from the nurse regarding a Rx that is being called to the pharmacy for him, and would like to know if the Rx is for 6 mths.   Best contact number [phone](please leave message on voice mail)

## 2019-06-05 NOTE — TELEPHONE ENCOUNTER
[facility] [place] 
THE [facility] EMERGENCY DEPT 
[place] [id number] 
[phone] Work/School Note Date: 1/23/2018 To Whom It May concern: 
 
Kelle Bradshaw was seen and treated today in the emergency room by the following provider(s): 
Attending Provider: Palak Medina MD 
Physician Assistant: WILLIS Glover. Kelle Bradshaw may return to work on 01/29/2018. Sincerely, Elsy Tam PA-C 
 
 
 I called and left a message that the medication was sent in for a 30 day supply with at least 6 months of refills (controlled so will only go for 6 months). We can't send in for 1 6 month supply, but if it is needed for 3 month supply to call back and I can change that.   Asked for call back if needed

## 2019-06-14 LAB
LACOSAMIDE SERPL-MCNC: 11.8 UG/ML (ref 5–10)
PHENOBARB SERPL-MCNC: 20 UG/ML (ref 15–40)
PHENOBARB SERPL-MCNC: 20 UG/ML (ref 15–40)
PRIMIDONE SERPL-MCNC: 18.8 UG/ML (ref 5–12)

## 2019-07-06 DIAGNOSIS — G40.209 COMPLEX PARTIAL SEIZURE EVOLVING TO GENERALIZED SEIZURE (HCC): ICD-10-CM

## 2019-07-06 RX ORDER — PHENOBARBITAL 30 MG/1
TABLET ORAL
Qty: 180 TAB | Refills: 3 | Status: SHIPPED | OUTPATIENT
Start: 2019-07-06 | End: 2019-12-17 | Stop reason: SDUPTHER

## 2019-12-17 ENCOUNTER — TELEPHONE (OUTPATIENT)
Dept: NEUROLOGY | Age: 74
End: 2019-12-17

## 2019-12-17 ENCOUNTER — OFFICE VISIT (OUTPATIENT)
Dept: NEUROLOGY | Age: 74
End: 2019-12-17

## 2019-12-17 VITALS
DIASTOLIC BLOOD PRESSURE: 90 MMHG | HEIGHT: 70 IN | OXYGEN SATURATION: 99 % | RESPIRATION RATE: 12 BRPM | SYSTOLIC BLOOD PRESSURE: 148 MMHG | WEIGHT: 203 LBS | BODY MASS INDEX: 29.06 KG/M2 | HEART RATE: 86 BPM

## 2019-12-17 DIAGNOSIS — G40.209 COMPLEX PARTIAL SEIZURE EVOLVING TO GENERALIZED SEIZURE (HCC): Primary | ICD-10-CM

## 2019-12-17 RX ORDER — PRIMIDONE 250 MG/1
500 TABLET ORAL 2 TIMES DAILY
Qty: 120 TAB | Refills: 11 | Status: SHIPPED | OUTPATIENT
Start: 2019-12-17 | End: 2020-12-17 | Stop reason: SDUPTHER

## 2019-12-17 RX ORDER — PHENOBARBITAL 30 MG/1
30 TABLET ORAL 2 TIMES DAILY
Qty: 60 TAB | Refills: 11 | Status: SHIPPED | OUTPATIENT
Start: 2019-12-17 | End: 2020-06-22

## 2019-12-17 RX ORDER — LACOSAMIDE 200 MG/1
TABLET ORAL
Qty: 60 TAB | Refills: 11 | Status: SHIPPED | OUTPATIENT
Start: 2019-12-17 | End: 2020-06-22
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
## 2019-12-17 NOTE — TELEPHONE ENCOUNTER
----- Message from Fernie Sheffield 0428 sent at 12/17/2019 12:23 PM EST -----  Regarding: dr cruz/ telephone  Patient return call    Caller's first and last name and relationship (if not the patient): pt      Best contact number(s): [phone]      Whose call is being returned: doctor      Details to clarify the request: in regards to his visit today and the call he received       Mert Martin

## 2019-12-17 NOTE — PATIENT INSTRUCTIONS
A Healthy Lifestyle: Care Instructions Your Care Instructions A healthy lifestyle can help you feel good, stay at a healthy weight, and have plenty of energy for both work and play. A healthy lifestyle is something you can share with your whole family. A healthy lifestyle also can lower your risk for serious health problems, such as high blood pressure, heart disease, and diabetes. You can follow a few steps listed below to improve your health and the health of your family. Follow-up care is a key part of your treatment and safety. Be sure to make and go to all appointments, and call your doctor if you are having problems. It's also a good idea to know your test results and keep a list of the medicines you take. How can you care for yourself at home? · Do not eat too much sugar, fat, or fast foods. You can still have dessert and treats now and then. The goal is moderation. · Start small to improve your eating habits. Pay attention to portion sizes, drink less juice and soda pop, and eat more fruits and vegetables. ? Eat a healthy amount of food. A 3-ounce serving of meat, for example, is about the size of a deck of cards. Fill the rest of your plate with vegetables and whole grains. ? Limit the amount of soda and sports drinks you have every day. Drink more water when you are thirsty. ? Eat at least 5 servings of fruits and vegetables every day. It may seem like a lot, but it is not hard to reach this goal. A serving or helping is 1 piece of fruit, 1 cup of vegetables, or 2 cups of leafy, raw vegetables. Have an apple or some carrot sticks as an afternoon snack instead of a candy bar. Try to have fruits and/or vegetables at every meal. 
· Make exercise part of your daily routine. You may want to start with simple activities, such as walking, bicycling, or slow swimming. Try to be active 30 to 60 minutes every day.  You do not need to do all 30 to 60 minutes all at once. For example, you can exercise 3 times a day for 10 or 20 minutes. Moderate exercise is safe for most people, but it is always a good idea to talk to your doctor before starting an exercise program. 
· Keep moving. Manjinder Glance the lawn, work in the garden, or [employer]. Take the stairs instead of the elevator at work. · If you smoke, quit. People who smoke have an increased risk for heart attack, stroke, cancer, and other lung illnesses. Quitting is hard, but there are ways to boost your chance of quitting tobacco for good. ? Use nicotine gum, patches, or lozenges. ? Ask your doctor about stop-smoking programs and medicines. ? Keep trying. In addition to reducing your risk of diseases in the future, you will notice some benefits soon after you stop using tobacco. If you have shortness of breath or asthma symptoms, they will likely get better within a few weeks after you quit. · Limit how much alcohol you drink. Moderate amounts of alcohol (up to 2 drinks a day for men, 1 drink a day for women) are okay. But drinking too much can lead to liver problems, high blood pressure, and other health problems. Family health If you have a family, there are many things you can do together to improve your health. · Eat meals together as a family as often as possible. · Eat healthy foods. This includes fruits, vegetables, lean meats and dairy, and whole grains. · Include your family in your fitness plan. Most people think of activities such as jogging or tennis as the way to fitness, but there are many ways you and your family can be more active. Anything that makes you breathe hard and gets your heart pumping is exercise. Here are some tips: 
? Walk to do errands or to take your child to school or the bus. 
? Go for a family bike ride after dinner instead of watching TV. Where can you learn more? Go to http://elana-vu.info/. Enter N459 in the search box to learn more about \"A Healthy Lifestyle: Care Instructions. \" Current as of: May 28, 2019 Content Version: 12.2 © 6069-6867 [employer]. Care instructions adapted under license by [employer] (which disclaims liability or warranty for this information). If you have questions about a medical condition or this instruction, always ask your healthcare professional. [address] any warranty or liability for your use of this information. Office Policies 
 
o Phone calls/patient messages: Please allow up to 24 hours for someone in the office to contact you about your call or message. Be mindful your provider may be out of the office or your message may require further review. We encourage you to use [employer] for your messages as this is a faster, more efficient way to communicate with our office 
 
o Medication Refills: 
Prescription medications require up to 48 business hours to process. We encourage you to use [employer] for your refills. For controlled medications: Please allow up to 72 business hours to process. Certain medications may require you to  a written prescription at our office. NO narcotic/controlled medications will be prescribed after 4pm Monday through Friday or on weekends 
 
o Form/Paperwork Completion: We ask that you allow 7-14 business days. You may also download your forms to [employer] to have your doctor print off.
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
## 2019-12-18 NOTE — TELEPHONE ENCOUNTER
He received papers after being seen. States on his papers, it did not show the Vimpat. I notified him that his medications were sent over and he should get his lab slip in the mail.  I called and confirmed with the pharmacy that they received the scripts

## 2020-01-08 ENCOUNTER — TELEPHONE (OUTPATIENT)
Dept: NEUROLOGY | Age: 75
End: 2020-01-08

## 2020-01-08 NOTE — TELEPHONE ENCOUNTER
Please call, patient left a DMV form at his last visit and just got a letter from [facility] saying that they have not received it and he has until January 20th of this month or he is suspended from driving. Please contact.

## 2020-01-09 NOTE — TELEPHONE ENCOUNTER
Pt aware forms were faxed on 12/17/19. Pt states DMV did not receive fax and gave [phone] and [phone] for fax to be expidited. Advised pt will refax forms to both numbers.

## 2020-06-22 DIAGNOSIS — G40.209 COMPLEX PARTIAL SEIZURE EVOLVING TO GENERALIZED SEIZURE (HCC): ICD-10-CM

## 2020-06-22 RX ORDER — PHENOBARBITAL 30 MG/1
TABLET ORAL
Qty: 60 TAB | Refills: 4 | Status: SHIPPED | OUTPATIENT
Start: 2020-06-22 | End: 2020-12-17 | Stop reason: SDUPTHER

## 2020-06-22 RX ORDER — LACOSAMIDE 200 MG/1
TABLET ORAL
Qty: 60 TAB | Refills: 4 | Status: SHIPPED | OUTPATIENT
Start: 2020-06-22 | End: 2020-11-16

## 2020-08-19 LAB
LACOSAMIDE SERPL-MCNC: 9.9 UG/ML (ref 5–10)
PHENOBARB SERPL-MCNC: 23 UG/ML (ref 15–40)
PHENOBARB SERPL-MCNC: 24 UG/ML (ref 15–40)
PRIMIDONE SERPL-MCNC: 17.9 UG/ML (ref 5–12)

## 2020-11-16 DIAGNOSIS — G40.209 COMPLEX PARTIAL SEIZURE EVOLVING TO GENERALIZED SEIZURE (HCC): ICD-10-CM

## 2020-11-16 RX ORDER — LACOSAMIDE 200 MG/1
TABLET ORAL
Qty: 60 TAB | Refills: 0 | Status: SHIPPED | OUTPATIENT
Start: 2020-11-16 | End: 2020-12-17 | Stop reason: SDUPTHER

## 2020-11-23 ENCOUNTER — TELEPHONE (OUTPATIENT)
Dept: NEUROLOGY | Age: 75
End: 2020-11-23

## 2020-11-23 DIAGNOSIS — G40.209 COMPLEX PARTIAL SEIZURE EVOLVING TO GENERALIZED SEIZURE (HCC): ICD-10-CM

## 2020-11-24 NOTE — TELEPHONE ENCOUNTER
I called the patient and he thinks the pharmacy does not have the refill sent on 11/18/2020 because his bottle says no more refills. I assured him that I will have this completed for him. I called the pharmacy and they confirmed with me they have the medication on file for him when ready to fill.     We will see him in office at his appointment because he does not have a smart phone or computer and prefers to come in to the office

## 2020-12-17 ENCOUNTER — OFFICE VISIT (OUTPATIENT)
Dept: NEUROLOGY | Age: 75
End: 2020-12-17
Payer: MEDICARE

## 2020-12-17 VITALS — BODY MASS INDEX: 29.41 KG/M2 | WEIGHT: 205 LBS

## 2020-12-17 DIAGNOSIS — G40.209 COMPLEX PARTIAL SEIZURE EVOLVING TO GENERALIZED SEIZURE (HCC): Primary | ICD-10-CM

## 2020-12-17 PROCEDURE — G8432 DEP SCR NOT DOC, RNG: HCPCS | Performed by: PSYCHIATRY & NEUROLOGY

## 2020-12-17 PROCEDURE — G8536 NO DOC ELDER MAL SCRN: HCPCS | Performed by: PSYCHIATRY & NEUROLOGY

## 2020-12-17 PROCEDURE — 1101F PT FALLS ASSESS-DOCD LE1/YR: CPT | Performed by: PSYCHIATRY & NEUROLOGY

## 2020-12-17 PROCEDURE — 3017F COLORECTAL CA SCREEN DOC REV: CPT | Performed by: PSYCHIATRY & NEUROLOGY

## 2020-12-17 PROCEDURE — G8419 CALC BMI OUT NRM PARAM NOF/U: HCPCS | Performed by: PSYCHIATRY & NEUROLOGY

## 2020-12-17 PROCEDURE — 99443 PR PHYS/QHP TELEPHONE EVALUATION 21-30 MIN: CPT | Performed by: PSYCHIATRY & NEUROLOGY

## 2020-12-17 PROCEDURE — G8427 DOCREV CUR MEDS BY ELIG CLIN: HCPCS | Performed by: PSYCHIATRY & NEUROLOGY

## 2020-12-17 RX ORDER — PRIMIDONE 250 MG/1
500 TABLET ORAL 2 TIMES DAILY
Qty: 120 TAB | Refills: 11 | Status: SHIPPED | OUTPATIENT
Start: 2020-12-17 | End: 2021-11-01 | Stop reason: SDUPTHER

## 2020-12-17 RX ORDER — PHENOBARBITAL 30 MG/1
TABLET ORAL
Qty: 60 TAB | Refills: 5 | Status: SHIPPED | OUTPATIENT
Start: 2020-12-17 | End: 2021-07-12

## 2020-12-17 RX ORDER — LACOSAMIDE 200 MG/1
TABLET ORAL
Qty: 60 TAB | Refills: 11 | Status: SHIPPED | OUTPATIENT
Start: 2020-12-17 | End: 2021-06-09

## 2020-12-17 NOTE — PROGRESS NOTES
Leilani oMss is a 70 y.o. male, evaluated via audio-only technology on 12/17/2020 for Seizure (Last episode 2014, no complaints ) and Follow-up  . Assessment & Plan:     Diagnoses and all orders for this visit:    1. 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 -     PRIMIDONE (MYSOLINE)  -     PHENOBARBITAL  -     LACOSAMIDE      Patient seen for follow-up of his seizures, and in the last year he has not had any seizures, and seems to be tolerating the medication well, but his last levels did show a high Mysoline level, and phenobarbital was not done, so we will need to repeat those to make sure that they do not get too high. His Vimpat level was 9.9 and the upper limits of normal is 10. He has had no seizures since 2014 on his current dose of medication and doing well otherwise. He does have slight trouble with his balance, which is a new problem, says he has trouble standing on one leg and putting his pants on, particularly on carpet, seems to do better on tile floor, need to be his medication so we need to check his levels to make sure he is not too high or toxic. He does not complain of drowsiness sedation or other ataxia. He has had no falls. He tried Keppra in the past but that caused dizziness and was discontinued and Trileptal caused hyponatremia but is done well on this combination of medications. His last EEG February 2017 was normal.  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 He has not had any new stroke or strokelike symptoms in the interim.   We talked about his medications, and his DMV form and he needs to see an eye doctor and I suggested he do that in the summer, and he has to fill out his DMV form with us in December next year so we will see him again in a years time as long as he is stable and refills for his medications sent in drug levels checked. We discussed the pandemic, and the need to continue his medications and have him call us if there is any change or new medical problems. 23 minutes spent with the patient, going over his DMV form, going over his follow-up visit, going over his medication levels, and discussing further treatment options, change in medication possibly, and his DMV form, and his need to get an eye exam.  The complexity of medical decision making for this visit is high   Follow-up and Dispositions    · Return in about 1 year (around 12/17/2021). 12  Subjective:       Prior to Admission medications    Medication Sig Start Date End Date Taking? Authorizing Provider   lacosamide (Vimpat) 200 mg tab tablet TAKE ONE TABLET BY MOUTH TWICE A DAY 12/17/20  Yes Mara Sahni MD   PHENobarbitaL (LUMINAL) 30 mg tablet TAKE ONE TABLET BY MOUTH TWICE A DAY 12/17/20  Yes Mara Sahni MD   primidone (MYSOLINE) 250 mg tablet Take 2 Tabs by mouth two (2) times a day. 12/17/20  Yes Mara Sahni MD   b complex vitamins (B COMPLEX 1) tablet Take 1 Tab by mouth daily. Yes Provider, Historical   cholecalciferol (VITAMIN D3) 1,000 unit tablet Take  by mouth daily. 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  120 Tab 11    b complex vitamins (B COMPLEX 1) tablet Take 1 Tab by mouth daily.  cholecalciferol (VITAMIN D3) 1,000 unit tablet Take  by mouth daily. No Known Allergies  Past Medical History:   Diagnosis Date    CAD (coronary artery disease)     Cancer ([facility] [address]     Prostate    Seizures ([facility] [address]      Past Surgical History:   Procedure Laterality Date    HX PROSTATECTOMY      2002     Family History   Problem Relation Age of Onset    GERD Mother     Arthritis-osteo Mother     Other Mother         diverticular disease    Thyroid Disease Mother     Cancer Father         lung and prostate    Thyroid Disease Sister     Cancer Brother         prostate     Social History     Tobacco Use    Smoking status: Never Smoker    Smokeless tobacco: Never Used   Substance Use Topics    Alcohol use: No       ROS    No flowsheet data found. Brandon Webb, who was evaluated through a patient-initiated, synchronous (real-time) audio only encounter, and/or her healthcare decision maker, is aware that it is a billable service, with coverage as determined by his insurance carrier. He provided verbal consent to proceed: Yes. He has not had a related appointment within my department in the past 7 days or scheduled within the next 24 hours.       Total Time: minutes: 21-30 minutes    Deniz Kimbrough MD

## 2021-04-20 ENCOUNTER — HOSPITAL ENCOUNTER (OUTPATIENT)
Dept: LAB | Age: 76
Discharge: HOME | End: 2021-04-20
Payer: MEDICARE

## 2021-04-20 PROCEDURE — 80188 ASSAY OF PRIMIDONE: CPT

## 2021-04-20 PROCEDURE — 80184 ASSAY OF PHENOBARBITAL: CPT

## 2021-04-20 PROCEDURE — 80235 DRUG ASSAY LACOSAMIDE: CPT

## 2021-04-20 PROCEDURE — 36415 COLL VENOUS BLD VENIPUNCTURE: CPT

## 2021-04-23 LAB
LACOSAMIDE SERPL-MCNC: 9.9 UG/ML (ref 5–10)
PHENOBARB SERPL-MCNC: 20 UG/ML (ref 15–40)
PHENOBARB SERPL-MCNC: 22 UG/ML (ref 15–40)
PRIMIDONE SERPL-MCNC: 19 UG/ML (ref 5–12)

## 2021-06-09 DIAGNOSIS — G40.209 COMPLEX PARTIAL SEIZURE EVOLVING TO GENERALIZED SEIZURE (HCC): ICD-10-CM

## 2021-06-09 RX ORDER — LACOSAMIDE 200 MG/1
TABLET ORAL
Qty: 60 TABLET | Refills: 4 | Status: SHIPPED | OUTPATIENT
Start: 2021-06-09 | End: 2021-11-01 | Stop reason: SDUPTHER

## 2021-07-12 DIAGNOSIS — G40.209 COMPLEX PARTIAL SEIZURE EVOLVING TO GENERALIZED SEIZURE (HCC): ICD-10-CM

## 2021-07-12 RX ORDER — PHENOBARBITAL 30 MG/1
TABLET ORAL
Qty: 60 TABLET | Refills: 4 | Status: SHIPPED | OUTPATIENT
Start: 2021-07-12 | End: 2021-11-01 | Stop reason: SDUPTHER

## 2021-11-01 ENCOUNTER — OFFICE VISIT (OUTPATIENT)
Dept: NEUROLOGY | Age: 76
End: 2021-11-01
Payer: MEDICARE

## 2021-11-01 VITALS
BODY MASS INDEX: 28.63 KG/M2 | TEMPERATURE: 97.7 F | WEIGHT: 200 LBS | HEART RATE: 86 BPM | RESPIRATION RATE: 12 BRPM | OXYGEN SATURATION: 97 % | DIASTOLIC BLOOD PRESSURE: 80 MMHG | HEIGHT: 70 IN | SYSTOLIC BLOOD PRESSURE: 130 MMHG

## 2021-11-01 DIAGNOSIS — G40.209 COMPLEX PARTIAL SEIZURE EVOLVING TO GENERALIZED SEIZURE (HCC): ICD-10-CM

## 2021-11-01 PROCEDURE — G8428 CUR MEDS NOT DOCUMENT: HCPCS | Performed by: PSYCHIATRY & NEUROLOGY

## 2021-11-01 PROCEDURE — G8536 NO DOC ELDER MAL SCRN: HCPCS | Performed by: PSYCHIATRY & NEUROLOGY

## 2021-11-01 PROCEDURE — 3017F COLORECTAL CA SCREEN DOC REV: CPT | Performed by: PSYCHIATRY & NEUROLOGY

## 2021-11-01 PROCEDURE — G8419 CALC BMI OUT NRM PARAM NOF/U: HCPCS | Performed by: PSYCHIATRY & NEUROLOGY

## 2021-11-01 PROCEDURE — 1101F PT FALLS ASSESS-DOCD LE1/YR: CPT | Performed by: PSYCHIATRY & NEUROLOGY

## 2021-11-01 PROCEDURE — G8432 DEP SCR NOT DOC, RNG: HCPCS | Performed by: PSYCHIATRY & NEUROLOGY

## 2021-11-01 PROCEDURE — 99214 OFFICE O/P EST MOD 30 MIN: CPT | Performed by: PSYCHIATRY & NEUROLOGY

## 2021-11-01 RX ORDER — LACOSAMIDE 200 MG/1
TABLET ORAL
Qty: 180 TABLET | Refills: 1 | Status: SHIPPED | OUTPATIENT
Start: 2021-11-01 | End: 2022-03-23 | Stop reason: SDUPTHER

## 2021-11-01 RX ORDER — PRIMIDONE 250 MG/1
500 TABLET ORAL 2 TIMES DAILY
Qty: 360 TABLET | Refills: 4 | Status: SHIPPED | OUTPATIENT
Start: 2021-11-01 | End: 2022-11-01 | Stop reason: SDUPTHER

## 2021-11-01 RX ORDER — PHENOBARBITAL 30 MG/1
30 TABLET ORAL 2 TIMES DAILY
Qty: 180 TABLET | Refills: 1 | Status: SHIPPED | OUTPATIENT
Start: 2021-11-01 | End: 2022-05-10
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
## 2021-11-04 ENCOUNTER — DOCUMENTATION ONLY (OUTPATIENT)
Dept: NEUROLOGY | Age: 76
End: 2021-11-04

## 2022-03-18 PROBLEM — G40.209 COMPLEX PARTIAL SEIZURE EVOLVING TO GENERALIZED SEIZURE (HCC): Status: ACTIVE | Noted: 2017-02-13

## 2022-03-22 DIAGNOSIS — G40.209 COMPLEX PARTIAL SEIZURE EVOLVING TO GENERALIZED SEIZURE (HCC): ICD-10-CM

## 2022-03-22 NOTE — TELEPHONE ENCOUNTER
Received call from pt, he is requesting a new rx for his Vimpat. He is asking for \"6 rx's for 6 months. \"

## 2022-03-23 RX ORDER — LACOSAMIDE 200 MG/1
TABLET ORAL
Qty: 180 TABLET | Refills: 1 | Status: SHIPPED | OUTPATIENT
Start: 2022-03-23 | End: 2022-05-03 | Stop reason: SDUPTHER

## 2022-03-23 NOTE — TELEPHONE ENCOUNTER
Received call from patient requesting Vimpat. Last refill:11/1/21    Next appointment:11/1/2022    Please review and fill if warranted.

## 2022-04-26 ENCOUNTER — TELEPHONE (OUTPATIENT)
Dept: NEUROLOGY | Age: 77
End: 2022-04-26

## 2022-04-26 NOTE — TELEPHONE ENCOUNTER
Sent Vimpat PA to [employer]. Response: This medication or product is on your plan's list of covered drugs. Prior authorization is not required at this time. If your pharmacy has questions regarding the processing of your prescription, please have them call the [employer] pharmacy help desk at [phone]. **Please note: This request was submitted electronically. Formulary lowering, tiering exception, cost reduction and/or pre-benefit determination review (including prospective Medicare hospice reviews) requests cannot be requested using this method of submission. Providers contact us at [phone] for further assistance. Faxed this gisel to his [address] on [address].

## 2022-05-02 ENCOUNTER — TELEPHONE (OUTPATIENT)
Dept: NEUROLOGY | Age: 77
End: 2022-05-02

## 2022-05-02 NOTE — TELEPHONE ENCOUNTER
Pt states he went to [facility] this weekend and they still did not have his Vimpat script for him. [facility] faxed no PA needed note over to them last week.  Please call pt. [phone]

## 2022-05-03 DIAGNOSIS — G40.209 COMPLEX PARTIAL SEIZURE EVOLVING TO GENERALIZED SEIZURE (HCC): ICD-10-CM

## 2022-05-03 RX ORDER — LACOSAMIDE 200 MG/1
TABLET ORAL
Qty: 60 TABLET | Refills: 5 | Status: SHIPPED | OUTPATIENT
Start: 2022-05-03 | End: 2022-05-30 | Stop reason: SDUPTHER

## 2022-05-03 NOTE — TELEPHONE ENCOUNTER
Patient Id confirmed. Stated he needed new script for monthly refill only. Insurance pays for [employer].

## 2022-05-10 DIAGNOSIS — G40.209 COMPLEX PARTIAL SEIZURE EVOLVING TO GENERALIZED SEIZURE (HCC): ICD-10-CM

## 2022-05-10 RX ORDER — PHENOBARBITAL 30 MG/1
TABLET ORAL
Qty: 180 TABLET | Refills: 1 | Status: SHIPPED | OUTPATIENT
Start: 2022-05-10 | End: 2022-11-01 | Stop reason: SDUPTHER

## 2022-05-27 ENCOUNTER — TELEPHONE (OUTPATIENT)
Dept: NEUROLOGY | Age: 77
End: 2022-05-27

## 2022-05-30 ENCOUNTER — TELEPHONE (OUTPATIENT)
Dept: NEUROLOGY | Age: 77
End: 2022-05-30

## 2022-05-30 DIAGNOSIS — G40.209 COMPLEX PARTIAL SEIZURE EVOLVING TO GENERALIZED SEIZURE (HCC): ICD-10-CM

## 2022-05-30 RX ORDER — LACOSAMIDE 200 MG/1
TABLET ORAL
Qty: 60 TABLET | Refills: 5 | Status: SHIPPED | OUTPATIENT
Start: 2022-05-30 | End: 2022-11-01 | Stop reason: SDUPTHER

## 2022-05-31 NOTE — TELEPHONE ENCOUNTER
The prescription was not for the brand name, d.a.w. was not checked, but I rewrote another 1 if you needed it, generic is fine if it is available now

## 2022-05-31 NOTE — TELEPHONE ENCOUNTER
The P.A. I submitted was for the brand Vimpat    You are requesting a brand medication where a generic equivalent is available. If the generic medication is preferred then the case will be cancelled and a new request for the generic will be initiated. Will the request be for the following generic equivalent: LACOSAMIDE  MG     Yes. I will initiate a new case for the generic equivalent   No. I would like to proceed with the requested product - I answered No.     If the pharmacy filled generic, and the PA response is in regards to brand, unless it is written that substitution is permitted, I suppose Dr. Nancy Montiel could re-write for SIDDHARTHA. I am referring this to Dr. Nancy Montiel for review, as Vimpat does not require a prior auth - in the brand. This is a copy of the letter from [employer]:     Patient Name: Wei Li Patient : 1945   Patient ID: [id number] Status of Request: Cancelled  Medication Name: Vimpat Tab 200mg [facility]: [id number]    Decision Notes: This medication or product is on your plan's list of covered drugs. Prior authorization is not required at  this time. If your pharmacy has questions regarding the processing of your prescription, please have  them call the [employer] pharmacy help desk at [phone]. If the treating physician would like to discuss this coverage decision with the physician or health care  professional reviewer, please call [employer] Prior Authorization department at [phone].

## 2022-06-06 ENCOUNTER — TELEPHONE (OUTPATIENT)
Dept: NEUROLOGY | Age: 77
End: 2022-06-06

## 2022-06-06 NOTE — TELEPHONE ENCOUNTER
Re: João Gupta [facility] -s/w [place]per[place] in the P.A. dept. The brand or the generic - neither require a prior auth. She could see it was too soon to fill - can fill on 6/19/22. Faxing this update to [address] [place] [address] faxed to [phone].

## 2022-11-01 ENCOUNTER — OFFICE VISIT (OUTPATIENT)
Dept: NEUROLOGY | Age: 77
End: 2022-11-01
Payer: MEDICARE

## 2022-11-01 VITALS
HEART RATE: 85 BPM | RESPIRATION RATE: 15 BRPM | SYSTOLIC BLOOD PRESSURE: 138 MMHG | WEIGHT: 195 LBS | HEIGHT: 70 IN | BODY MASS INDEX: 27.92 KG/M2 | TEMPERATURE: 97.8 F | DIASTOLIC BLOOD PRESSURE: 86 MMHG | OXYGEN SATURATION: 99 %

## 2022-11-01 DIAGNOSIS — G40.209 COMPLEX PARTIAL SEIZURE EVOLVING TO GENERALIZED SEIZURE (HCC): Primary | ICD-10-CM

## 2022-11-01 PROCEDURE — 3017F COLORECTAL CA SCREEN DOC REV: CPT | Performed by: PSYCHIATRY & NEUROLOGY

## 2022-11-01 PROCEDURE — G8510 SCR DEP NEG, NO PLAN REQD: HCPCS | Performed by: PSYCHIATRY & NEUROLOGY

## 2022-11-01 PROCEDURE — G8427 DOCREV CUR MEDS BY ELIG CLIN: HCPCS | Performed by: PSYCHIATRY & NEUROLOGY

## 2022-11-01 PROCEDURE — 99213 OFFICE O/P EST LOW 20 MIN: CPT | Performed by: PSYCHIATRY & NEUROLOGY

## 2022-11-01 PROCEDURE — G8536 NO DOC ELDER MAL SCRN: HCPCS | Performed by: PSYCHIATRY & NEUROLOGY

## 2022-11-01 PROCEDURE — 1123F ACP DISCUSS/DSCN MKR DOCD: CPT | Performed by: PSYCHIATRY & NEUROLOGY

## 2022-11-01 PROCEDURE — 1101F PT FALLS ASSESS-DOCD LE1/YR: CPT | Performed by: PSYCHIATRY & NEUROLOGY

## 2022-11-01 PROCEDURE — G8417 CALC BMI ABV UP PARAM F/U: HCPCS | Performed by: PSYCHIATRY & NEUROLOGY

## 2022-11-01 RX ORDER — PHENOBARBITAL 30 MG/1
30 TABLET ORAL 2 TIMES DAILY
Qty: 180 TABLET | Refills: 1 | Status: SHIPPED | OUTPATIENT
Start: 2022-11-01

## 2022-11-01 RX ORDER — LACOSAMIDE 200 MG/1
TABLET ORAL
Qty: 60 TABLET | Refills: 5 | Status: SHIPPED | OUTPATIENT
Start: 2022-11-01

## 2022-11-01 RX ORDER — PRIMIDONE 250 MG/1
500 TABLET ORAL 2 TIMES DAILY
Qty: 360 TABLET | Refills: 4 | Status: SHIPPED | OUTPATIENT
Start: 2022-11-01
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
## 2022-11-01 NOTE — PROGRESS NOTES
1. Have you been to the ER, urgent care clinic since your last visit? Hospitalized since your last visit? No.    2. Have you seen or consulted any other health care providers outside of the [address] [place] [address] since your last visit? Include any pap smears or colon screening.    No.        Chief Complaint   Patient presents with    Seizure     Annual-  pt denies any symptoms
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
## 2023-05-15 DIAGNOSIS — G40.209 LOCALIZATION-RELATED (FOCAL) (PARTIAL) SYMPTOMATIC EPILEPSY AND EPILEPTIC SYNDROMES WITH COMPLEX PARTIAL SEIZURES, NOT INTRACTABLE, WITHOUT STATUS EPILEPTICUS (HCC): Primary | ICD-10-CM

## 2023-05-15 RX ORDER — LACOSAMIDE 200 MG/1
TABLET ORAL
Qty: 180 TABLET | Refills: 3 | Status: SHIPPED | OUTPATIENT
Start: 2023-05-15 | End: 2024-05-14

## 2023-11-08 ENCOUNTER — OFFICE VISIT (OUTPATIENT)
Age: 78
End: 2023-11-08
Payer: MEDICARE

## 2023-11-08 VITALS
TEMPERATURE: 98 F | HEART RATE: 94 BPM | SYSTOLIC BLOOD PRESSURE: 128 MMHG | DIASTOLIC BLOOD PRESSURE: 82 MMHG | BODY MASS INDEX: 27.35 KG/M2 | WEIGHT: 191 LBS | OXYGEN SATURATION: 98 % | RESPIRATION RATE: 16 BRPM | HEIGHT: 70 IN

## 2023-11-08 DIAGNOSIS — G40.209 COMPLEX PARTIAL SEIZURE EVOLVING TO GENERALIZED SEIZURE (HCC): Primary | ICD-10-CM

## 2023-11-08 DIAGNOSIS — G40.209 COMPLEX PARTIAL SEIZURE EVOLVING TO GENERALIZED SEIZURE (HCC): ICD-10-CM

## 2023-11-08 DIAGNOSIS — Z51.81 THERAPEUTIC DRUG MONITORING: ICD-10-CM

## 2023-11-08 LAB
ALBUMIN SERPL-MCNC: 4.1 G/DL (ref 3.5–5)
ALBUMIN/GLOB SERPL: 1.2 (ref 1.1–2.2)
ALP SERPL-CCNC: 163 U/L (ref 45–117)
ALT SERPL-CCNC: 20 U/L (ref 12–78)
ANION GAP SERPL CALC-SCNC: 7 MMOL/L (ref 5–15)
AST SERPL-CCNC: 10 U/L (ref 15–37)
BASOPHILS # BLD: 0 K/UL (ref 0–0.1)
BASOPHILS NFR BLD: 1 % (ref 0–1)
BILIRUB SERPL-MCNC: 0.6 MG/DL (ref 0.2–1)
BUN SERPL-MCNC: 10 MG/DL (ref 6–20)
BUN/CREAT SERPL: 8 (ref 12–20)
CALCIUM SERPL-MCNC: 9.7 MG/DL (ref 8.5–10.1)
CHLORIDE SERPL-SCNC: 104 MMOL/L (ref 97–108)
CO2 SERPL-SCNC: 27 MMOL/L (ref 21–32)
CREAT SERPL-MCNC: 1.2 MG/DL (ref 0.7–1.3)
DIFFERENTIAL METHOD BLD: NORMAL
EOSINOPHIL # BLD: 0.1 K/UL (ref 0–0.4)
EOSINOPHIL NFR BLD: 2 % (ref 0–7)
ERYTHROCYTE [DISTWIDTH] IN BLOOD BY AUTOMATED COUNT: 13.3 % (ref 11.5–14.5)
GLOBULIN SER CALC-MCNC: 3.3 G/DL (ref 2–4)
GLUCOSE SERPL-MCNC: 92 MG/DL (ref 65–100)
HCT VFR BLD AUTO: 46.7 % (ref 36.6–50.3)
HGB BLD-MCNC: 15.7 G/DL (ref 12.1–17)
IMM GRANULOCYTES # BLD AUTO: 0 K/UL (ref 0–0.04)
IMM GRANULOCYTES NFR BLD AUTO: 0 % (ref 0–0.5)
LYMPHOCYTES # BLD: 1.1 K/UL (ref 0.8–3.5)
LYMPHOCYTES NFR BLD: 22 % (ref 12–49)
MCH RBC QN AUTO: 33.3 PG (ref 26–34)
MCHC RBC AUTO-ENTMCNC: 33.6 G/DL (ref 30–36.5)
MCV RBC AUTO: 98.9 FL (ref 80–99)
MONOCYTES # BLD: 0.3 K/UL (ref 0–1)
MONOCYTES NFR BLD: 7 % (ref 5–13)
NEUTS SEG # BLD: 3.3 K/UL (ref 1.8–8)
NEUTS SEG NFR BLD: 68 % (ref 32–75)
NRBC # BLD: 0 K/UL (ref 0–0.01)
NRBC BLD-RTO: 0 PER 100 WBC
PHENOBARB SERPL-MCNC: 23.4 UG/ML (ref 15–40)
PLATELET # BLD AUTO: 192 K/UL (ref 150–400)
PMV BLD AUTO: 10.1 FL (ref 8.9–12.9)
POTASSIUM SERPL-SCNC: 4 MMOL/L (ref 3.5–5.1)
PROT SERPL-MCNC: 7.4 G/DL (ref 6.4–8.2)
RBC # BLD AUTO: 4.72 M/UL (ref 4.1–5.7)
SODIUM SERPL-SCNC: 138 MMOL/L (ref 136–145)
WBC # BLD AUTO: 4.9 K/UL (ref 4.1–11.1)

## 2023-11-08 PROCEDURE — G8484 FLU IMMUNIZE NO ADMIN: HCPCS | Performed by: PSYCHIATRY & NEUROLOGY

## 2023-11-08 PROCEDURE — 1123F ACP DISCUSS/DSCN MKR DOCD: CPT | Performed by: PSYCHIATRY & NEUROLOGY

## 2023-11-08 PROCEDURE — G8419 CALC BMI OUT NRM PARAM NOF/U: HCPCS | Performed by: PSYCHIATRY & NEUROLOGY

## 2023-11-08 PROCEDURE — 1036F TOBACCO NON-USER: CPT | Performed by: PSYCHIATRY & NEUROLOGY

## 2023-11-08 PROCEDURE — G8427 DOCREV CUR MEDS BY ELIG CLIN: HCPCS | Performed by: PSYCHIATRY & NEUROLOGY

## 2023-11-08 PROCEDURE — 99214 OFFICE O/P EST MOD 30 MIN: CPT | Performed by: PSYCHIATRY & NEUROLOGY

## 2023-11-08 RX ORDER — VITAMIN B COMPLEX
1 CAPSULE ORAL DAILY
COMMUNITY

## 2023-11-08 ASSESSMENT — PATIENT HEALTH QUESTIONNAIRE - PHQ9
SUM OF ALL RESPONSES TO PHQ QUESTIONS 1-9: 0
2. FEELING DOWN, DEPRESSED OR HOPELESS: 0
SUM OF ALL RESPONSES TO PHQ9 QUESTIONS 1 & 2: 0
1. LITTLE INTEREST OR PLEASURE IN DOING THINGS: 0
SUM OF ALL RESPONSES TO PHQ QUESTIONS 1-9: 0

## 2023-11-08 NOTE — PROGRESS NOTES
acute distress. Mental Status: Oriented to time, place and person, and the president, cognitive function is normal and speech is fluent and no aphasia or dysarthria. Mood and affect appropriate. Cranial Nerves:   Intact visual fields. Fundi are benign, discs are flat, no lesions seen on funduscopy. TAWNYA, EOM's full, no nystagmus, no ptosis. Facial sensation is normal. Corneal reflexes are not tested. Facial movement is symmetric. Hearing is abnormal bilaterally. Palate is midline with normal sternocleidomastoid and trapezius muscles are normal. Tongue is midline. Neck without meningismus or bruits  Temporal arteries are not tender or enlarged    Motor:  5/5 strength in upper and lower proximal and distal muscles. Normal bulk and tone. No fasciculations. Rapid alternating movement is symmetric in all extremities and normal   Reflexes:   Deep tendon reflexes 2+/4 and symmetrical.  No babinski or clonus present   Sensory:   Normal to touch, pinprick and vibration and temperature. DSS is intact   Gait:  Normal gait. Tremor:   No tremor noted. Cerebellar:  No cerebellar signs present on Romberg, tandem and finger-nose-finger exam.   Neurovascular:  Normal heart sounds and regular rhythm, peripheral pulses decreased, and no carotid bruits. Assessment:      Diagnosis Orders   1. 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 Therapeutic drug monitoring  Comprehensive Metabolic Panel    CBC with Auto Differential    Primidone level    Phenobarbital Level    Lacosamide Level        Active Problems:    * No active hospital problems. *  Resolved Problems:    * No resolved hospital problems.  *      Plan:     Patient tolerated his medications well, they were renewed for him today, he is not anxious to have any blood work done, so we will hold on that he will

## 2023-11-10 LAB
PHENOBARB SERPL-MCNC: 22 UG/ML (ref 15–40)
PRIMIDONE SERPL-MCNC: 19.6 UG/ML (ref 5–12)

## 2023-11-13 LAB — LACOSAMIDE SERPL-MCNC: 12.5 UG/ML (ref 5–10)

## 2023-11-15 RX ORDER — PRIMIDONE 250 MG/1
500 TABLET ORAL 2 TIMES DAILY
Qty: 360 TABLET | Refills: 3 | Status: SHIPPED | OUTPATIENT
Start: 2023-11-15

## 2023-11-15 NOTE — TELEPHONE ENCOUNTER
Pt called requesting a medication refill on primidone (MYSOLINE) 250 MG tablet. Pt stated he only has 4 pills left.      Please send to:   [facility] [id number] [place], [place] - [address] [phone]  f [phone]

## 2023-11-20 DIAGNOSIS — G40.209 LOCALIZATION-RELATED (FOCAL) (PARTIAL) SYMPTOMATIC EPILEPSY AND EPILEPTIC SYNDROMES WITH COMPLEX PARTIAL SEIZURES, NOT INTRACTABLE, WITHOUT STATUS EPILEPTICUS (HCC): ICD-10-CM

## 2023-11-21 RX ORDER — LACOSAMIDE 200 MG/1
TABLET ORAL
Qty: 60 TABLET | Refills: 5 | Status: SHIPPED | OUTPATIENT
Start: 2023-11-21 | End: 2024-05-21

## 2023-12-04 ENCOUNTER — TELEPHONE (OUTPATIENT)
Age: 78
End: 2023-12-04

## 2023-12-04 NOTE — TELEPHONE ENCOUNTER
Called pt and LVM regarding DMV paperwork. According to last office visit note from Dr. Marin Michael,     \"We filled out his DMV form for him in detail today, and says he has had no seizures in 9 years, we stated he could continue driving and keep taking his medications\"    No DMV form scanned into media. No encounter stating anything was faxed.
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
## 2023-12-04 NOTE — TELEPHONE ENCOUNTER
Pt called stating he gave Dr. Lito Lawler paperwork at the last office visit on 11/08 to be filled out and faxed to [facility]. Pt called to get an updated status on paperwork due to him receiving a letter from [facility] stating if the paperwork was not sent in by 46/33, Pt driving License would be suspended.      Please fax to: [phone]

## 2023-12-05 ENCOUNTER — TELEPHONE (OUTPATIENT)
Age: 78
End: 2023-12-05

## 2023-12-05 NOTE — TELEPHONE ENCOUNTER
[facility] to call me. Does pt have the DMV form done while pt was in the office on 11-8-2023? Is not scanned into Media. (Does pt have the copy? Can fax to us and we can fax to [facility]. Or pt can sign release and we can redo the DMV form. No seizures since 2014).

## 2023-12-05 NOTE — TELEPHONE ENCOUNTER
DMV medical form faxed, Rudy Parker for pt advising this has been done, and confirmation was received from [facility]. FYI only for pt.

## 2023-12-05 NOTE — TELEPHONE ENCOUNTER
DMV form signed by Dr. Kenny Andrews. DMV medical form faxed to [facility], fax # [phone]. Confirmation received. Form sent for quick scanning to [employer].

## 2023-12-05 NOTE — TELEPHONE ENCOUNTER
Spoke with patient. Verified patient with two patient identifiers. States he was not given a copy of the DMV form when he was in the office. DMV form has not been scanned in. Pt will come to office, sign the DMV form.   We will complete the form and fax to [facility].  (Must be done before 12-)

## 2024-01-23 DIAGNOSIS — G40.209 LOCALIZATION-RELATED (FOCAL) (PARTIAL) SYMPTOMATIC EPILEPSY AND EPILEPTIC SYNDROMES WITH COMPLEX PARTIAL SEIZURES, NOT INTRACTABLE, WITHOUT STATUS EPILEPTICUS (HCC): ICD-10-CM

## 2024-01-24 RX ORDER — PHENOBARBITAL 30 MG/1
TABLET ORAL
Qty: 60 TABLET | Refills: 5 | Status: SHIPPED | OUTPATIENT
Start: 2024-01-24 | End: 2024-07-24

## 2024-05-25 DIAGNOSIS — G40.209 LOCALIZATION-RELATED (FOCAL) (PARTIAL) SYMPTOMATIC EPILEPSY AND EPILEPTIC SYNDROMES WITH COMPLEX PARTIAL SEIZURES, NOT INTRACTABLE, WITHOUT STATUS EPILEPTICUS (HCC): ICD-10-CM

## 2024-05-28 RX ORDER — LACOSAMIDE 200 MG/1
200 TABLET ORAL 2 TIMES DAILY
Qty: 60 TABLET | Refills: 5 | Status: SHIPPED | OUTPATIENT
Start: 2024-05-28 | End: 2024-11-28

## 2024-07-24 DIAGNOSIS — G40.209 LOCALIZATION-RELATED (FOCAL) (PARTIAL) SYMPTOMATIC EPILEPSY AND EPILEPTIC SYNDROMES WITH COMPLEX PARTIAL SEIZURES, NOT INTRACTABLE, WITHOUT STATUS EPILEPTICUS (HCC): ICD-10-CM

## 2024-07-25 RX ORDER — PHENOBARBITAL 30 MG/1
30 TABLET ORAL 2 TIMES DAILY
Qty: 60 TABLET | Refills: 2 | Status: SHIPPED | OUTPATIENT
Start: 2024-07-25 | End: 2024-10-23

## 2024-11-11 ENCOUNTER — OFFICE VISIT (OUTPATIENT)
Age: 79
End: 2024-11-11
Payer: MEDICARE

## 2024-11-11 VITALS
WEIGHT: 185.4 LBS | HEIGHT: 70 IN | DIASTOLIC BLOOD PRESSURE: 88 MMHG | BODY MASS INDEX: 26.54 KG/M2 | TEMPERATURE: 97.7 F | RESPIRATION RATE: 15 BRPM | HEART RATE: 94 BPM | OXYGEN SATURATION: 99 % | SYSTOLIC BLOOD PRESSURE: 130 MMHG

## 2024-11-11 DIAGNOSIS — G40.209 COMPLEX PARTIAL SEIZURE EVOLVING TO GENERALIZED SEIZURE (HCC): Primary | ICD-10-CM

## 2024-11-11 DIAGNOSIS — G40.209 LOCALIZATION-RELATED (FOCAL) (PARTIAL) SYMPTOMATIC EPILEPSY AND EPILEPTIC SYNDROMES WITH COMPLEX PARTIAL SEIZURES, NOT INTRACTABLE, WITHOUT STATUS EPILEPTICUS (HCC): ICD-10-CM

## 2024-11-11 DIAGNOSIS — Z51.81 THERAPEUTIC DRUG MONITORING: ICD-10-CM

## 2024-11-11 PROCEDURE — 1123F ACP DISCUSS/DSCN MKR DOCD: CPT | Performed by: PSYCHIATRY & NEUROLOGY

## 2024-11-11 PROCEDURE — 1159F MED LIST DOCD IN RCRD: CPT | Performed by: PSYCHIATRY & NEUROLOGY

## 2024-11-11 PROCEDURE — 1036F TOBACCO NON-USER: CPT | Performed by: PSYCHIATRY & NEUROLOGY

## 2024-11-11 PROCEDURE — 1160F RVW MEDS BY RX/DR IN RCRD: CPT | Performed by: PSYCHIATRY & NEUROLOGY

## 2024-11-11 PROCEDURE — G8427 DOCREV CUR MEDS BY ELIG CLIN: HCPCS | Performed by: PSYCHIATRY & NEUROLOGY

## 2024-11-11 PROCEDURE — 1126F AMNT PAIN NOTED NONE PRSNT: CPT | Performed by: PSYCHIATRY & NEUROLOGY

## 2024-11-11 PROCEDURE — G8484 FLU IMMUNIZE NO ADMIN: HCPCS | Performed by: PSYCHIATRY & NEUROLOGY

## 2024-11-11 PROCEDURE — 99213 OFFICE O/P EST LOW 20 MIN: CPT | Performed by: PSYCHIATRY & NEUROLOGY

## 2024-11-11 PROCEDURE — G8419 CALC BMI OUT NRM PARAM NOF/U: HCPCS | Performed by: PSYCHIATRY & NEUROLOGY

## 2024-11-11 RX ORDER — PRIMIDONE 250 MG/1
500 TABLET ORAL 2 TIMES DAILY
Qty: 360 TABLET | Refills: 3 | Status: SHIPPED | OUTPATIENT
Start: 2024-11-11

## 2024-11-11 RX ORDER — LACOSAMIDE 200 MG/1
200 TABLET ORAL 2 TIMES DAILY
Qty: 180 TABLET | Refills: 1 | Status: SHIPPED | OUTPATIENT
Start: 2024-11-11 | End: 2025-05-14

## 2024-11-11 ASSESSMENT — PATIENT HEALTH QUESTIONNAIRE - PHQ9
SUM OF ALL RESPONSES TO PHQ QUESTIONS 1-9: 0
2. FEELING DOWN, DEPRESSED OR HOPELESS: NOT AT ALL
1. LITTLE INTEREST OR PLEASURE IN DOING THINGS: NOT AT ALL
SUM OF ALL RESPONSES TO PHQ QUESTIONS 1-9: 0
SUM OF ALL RESPONSES TO PHQ QUESTIONS 1-9: 0
SUM OF ALL RESPONSES TO PHQ9 QUESTIONS 1 & 2: 0
SUM OF ALL RESPONSES TO PHQ QUESTIONS 1-9: 0

## 2024-11-11 NOTE — PROGRESS NOTES
Consult  REFERRED BY:  Newton Pantoja MD    CHIEF COMPLAINT: Patient seen for seizures and to follow-up on his medications.    Subjective:     Freddie Anderson is a 79 y.o.right-handed  male seen for evaluation at the request of Dr. Pantoja of problem of of having seizures, and he has not had any seizures since 2014 when he stopped his medications, as long as he was taking his medications he had no further seizures, and he has had none for the last 10 years.  He takes Vimpat 200 mg twice a day Mysoline 500 mg twice a day and phenobarbital 30 mg twice a day.  His last levels were slightly elevated with Vimpat being 12.5, and upper limits 10, and primidone was around 19 with upper limits of 15, but phenobarb was normal, and we suggested last time he cut down his medications admitted again to the patient, but he said he is doing fine did not want a change anything want to keep on the same dose so we refilled that for him today.  He tolerates his medication well without side effects, he has had a previous high Mysoline level of 18, but never wanted to change his medication because he is done so well for so long.  Previously we had talked about cutting down his medications but he seems to refuse that, and is very adamant he does not want to change, and he is taking phenobarbital, 30 mg twice a day and Vimpat 200 mg twice a day for his seizures and he is taking 500 mg of Mysoline twice a day.  His phenobarbital level with 19 and in the normal range.   He refuses to cut down his Mysoline level is that he is tolerating it well without side effect does not want to risk a seizure.   Patient denies any side effect on the medications, no drowsiness sedation dizziness or recurrent seizures.  His EEG was essentially normal February 2017.  Patient has had seizures since 1970, when he had his first seizure.  He was tried on Dilantin but developed gingival problems, and was placed on phenobarbital and Mysoline with good
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
## 2025-02-18 DIAGNOSIS — G40.209 COMPLEX PARTIAL SEIZURE EVOLVING TO GENERALIZED SEIZURE (HCC): Primary | ICD-10-CM

## 2025-02-18 NOTE — TELEPHONE ENCOUNTER
[facility] pharmacy called to request refill for   PHENobarbital (LUMINAL) 30 MG tablet   [facility] [id number] - [place] - [address] - P [phone] - F [phone]

## 2025-02-18 NOTE — TELEPHONE ENCOUNTER
Patient states he needs a refill on Lacosamide, phenobarbial and primidone. He is asking if we can get that done as soon a possible. Thank you.

## 2025-02-19 RX ORDER — PHENOBARBITAL 30 MG/1
30 TABLET ORAL 2 TIMES DAILY
Qty: 180 TABLET | Refills: 1 | Status: SHIPPED | OUTPATIENT
Start: 2025-02-19 | End: 2025-08-23

## 2025-02-19 NOTE — TELEPHONE ENCOUNTER
Last office visit: 11/11/2024  Next office visit: 11/03/2025  Last med refill: 11/11/2024 Lacosamide, primidone has enough refills    PHENobarbital (LUMINAL) 30 MG tablet [[id number]]  ENDED     Written Date: 07/25/24       Spoke with patient ID with two identifiers. Informed him that its to soon to refill his Lacosamide, primidone.    And PHENobarbital (LUMINAL) 30 MG tablet is not listed on his med list. He stated he is still taking it.

## 2025-05-29 DIAGNOSIS — G40.209 LOCALIZATION-RELATED (FOCAL) (PARTIAL) SYMPTOMATIC EPILEPSY AND EPILEPTIC SYNDROMES WITH COMPLEX PARTIAL SEIZURES, NOT INTRACTABLE, WITHOUT STATUS EPILEPTICUS (HCC): ICD-10-CM

## 2025-05-29 DIAGNOSIS — G40.209 COMPLEX PARTIAL SEIZURE EVOLVING TO GENERALIZED SEIZURE (HCC): ICD-10-CM

## 2025-05-29 DIAGNOSIS — Z51.81 THERAPEUTIC DRUG MONITORING: ICD-10-CM

## 2025-05-30 RX ORDER — LACOSAMIDE 200 MG/1
TABLET ORAL
Qty: 60 TABLET | Refills: 11 | Status: SHIPPED | OUTPATIENT
Start: 2025-05-30 | End: 2026-05-30